# Patient Record
Sex: FEMALE | Race: WHITE | NOT HISPANIC OR LATINO | URBAN - METROPOLITAN AREA
[De-identification: names, ages, dates, MRNs, and addresses within clinical notes are randomized per-mention and may not be internally consistent; named-entity substitution may affect disease eponyms.]

---

## 2018-05-18 ENCOUNTER — TRANSFERRED RECORDS (OUTPATIENT)
Dept: HEALTH INFORMATION MANAGEMENT | Facility: CLINIC | Age: 73
End: 2018-05-18

## 2018-05-21 ENCOUNTER — MEDICAL CORRESPONDENCE (OUTPATIENT)
Dept: HEALTH INFORMATION MANAGEMENT | Facility: CLINIC | Age: 73
End: 2018-05-21

## 2018-05-25 ENCOUNTER — PRE VISIT (OUTPATIENT)
Dept: NEUROLOGY | Facility: CLINIC | Age: 73
End: 2018-05-25

## 2018-05-25 NOTE — TELEPHONE ENCOUNTER
FUTURE VISIT INFORMATION      FUTURE VISIT INFORMATION:    Date: 05/30/2018     Time:     Location:   REFERRAL INFORMATION:    Referring provider:  No Records for thist patient. Patients Care Everywhere ID is [id number]     Referring providers clinic:      Reason for visit/diagnosis      RECORDS REQUESTED FROM:       Clinic name Comments Records Status Imaging Status   [facility]   Patients Care Everywhere ID is [id number].   CALLED PATEINT AND LEFT MESSAGE TO GIVE NEUROLOGY A CALL REGADING RECORDS.  IN-PROCESS IN-PROCESS                                   RECORDS STATUS    Internal Records: NONE   EXTERNAL RECORDS: NONE

## 2018-05-29 ASSESSMENT — ENCOUNTER SYMPTOMS
ALTERED TEMPERATURE REGULATION: 1
PANIC: 0
NECK MASS: 0
DIFFICULTY URINATING: 0
TROUBLE SWALLOWING: 0
HEADACHES: 1
NUMBNESS: 0
WEIGHT LOSS: 0
EYE REDNESS: 1
TASTE DISTURBANCE: 0
EYE IRRITATION: 1
INCREASED ENERGY: 0
DEPRESSION: 0
JOINT SWELLING: 0
ARTHRALGIAS: 1
DECREASED APPETITE: 0
SEIZURES: 0
NERVOUS/ANXIOUS: 1
CHILLS: 0
LOSS OF CONSCIOUSNESS: 0
WEAKNESS: 0
DIARRHEA: 0
SORE THROAT: 0
RECTAL PAIN: 0
FEVER: 0
SINUS CONGESTION: 1
EYE WATERING: 0
STIFFNESS: 1
HEMATURIA: 0
WEIGHT GAIN: 0
SINUS PAIN: 0
SPEECH CHANGE: 0
VOMITING: 0
JAUNDICE: 0
BLOATING: 0
CONSTIPATION: 0
HOARSE VOICE: 0
EYE PAIN: 0
TINGLING: 0
INSOMNIA: 1
POLYPHAGIA: 0
NAUSEA: 1
HEARTBURN: 0
HALLUCINATIONS: 0
ABDOMINAL PAIN: 0
BLOOD IN STOOL: 0
DYSURIA: 0
NECK PAIN: 1
TREMORS: 0
NIGHT SWEATS: 1
FLANK PAIN: 0
MUSCLE CRAMPS: 0
DOUBLE VISION: 0
FATIGUE: 1
DISTURBANCES IN COORDINATION: 1
MEMORY LOSS: 1
PARALYSIS: 0
MUSCLE WEAKNESS: 0
DECREASED CONCENTRATION: 1
BOWEL INCONTINENCE: 0
DIZZINESS: 1
SMELL DISTURBANCE: 0
MYALGIAS: 0
POLYDIPSIA: 0
BACK PAIN: 1

## 2018-05-30 ENCOUNTER — PRE VISIT (OUTPATIENT)
Dept: NEUROLOGY | Facility: CLINIC | Age: 73
End: 2018-05-30

## 2018-05-30 ENCOUNTER — OFFICE VISIT (OUTPATIENT)
Dept: NEUROLOGY | Facility: CLINIC | Age: 73
End: 2018-05-30
Payer: COMMERCIAL

## 2018-05-30 VITALS
TEMPERATURE: 97.8 F | RESPIRATION RATE: 24 BRPM | DIASTOLIC BLOOD PRESSURE: 88 MMHG | OXYGEN SATURATION: 97 % | SYSTOLIC BLOOD PRESSURE: 132 MMHG | HEART RATE: 95 BPM

## 2018-05-30 DIAGNOSIS — H53.9 VISUAL DISTURBANCE: ICD-10-CM

## 2018-05-30 DIAGNOSIS — R90.82 WHITE MATTER ABNORMALITY ON MRI OF BRAIN: ICD-10-CM

## 2018-05-30 DIAGNOSIS — R42 DIZZINESS: Primary | ICD-10-CM

## 2018-05-30 DIAGNOSIS — I95.1 ORTHOSTATIC HYPOTENSION: ICD-10-CM

## 2018-05-30 PROBLEM — I10 ESSENTIAL HYPERTENSION: Status: ACTIVE | Noted: 2018-05-30

## 2018-05-30 PROBLEM — F11.93 NARCOTIC WITHDRAWAL (H): Status: ACTIVE | Noted: 2018-04-13

## 2018-05-30 PROBLEM — K29.71 GASTRITIS AND GASTRODUODENITIS WITH HEMORRHAGE: Status: ACTIVE | Noted: 2018-05-30

## 2018-05-30 PROBLEM — K29.91 GASTRITIS AND GASTRODUODENITIS WITH HEMORRHAGE: Status: ACTIVE | Noted: 2018-05-30

## 2018-05-30 PROBLEM — K21.9 ESOPHAGEAL REFLUX: Status: ACTIVE | Noted: 2018-05-30

## 2018-05-30 RX ORDER — SIMVASTATIN 10 MG
10 TABLET ORAL DAILY
COMMUNITY

## 2018-05-30 RX ORDER — HYDROXYZINE HYDROCHLORIDE 25 MG/1
1-2 TABLET, FILM COATED ORAL DAILY PRN
COMMUNITY
Start: 2018-04-13

## 2018-05-30 RX ORDER — LISINOPRIL 10 MG/1
10 TABLET ORAL DAILY
COMMUNITY
Start: 2017-08-25

## 2018-05-30 RX ORDER — DIPHENOXYLATE HYDROCHLORIDE AND ATROPINE SULFATE 2.5; .025 MG/1; MG/1
1 TABLET ORAL DAILY
COMMUNITY
Start: 2007-06-19

## 2018-05-30 RX ORDER — ASPIRIN 81 MG/1
81 TABLET ORAL DAILY
COMMUNITY
Start: 2016-03-18 | End: 2019-01-09

## 2018-05-30 RX ORDER — CETIRIZINE HYDROCHLORIDE 10 MG/1
10 TABLET ORAL DAILY
COMMUNITY
Start: 2016-04-20

## 2018-05-30 RX ORDER — VALACYCLOVIR HYDROCHLORIDE 500 MG/1
500 TABLET, FILM COATED ORAL DAILY
COMMUNITY
Start: 2017-09-26

## 2018-05-30 RX ORDER — NAPROXEN SODIUM 220 MG
220 TABLET ORAL 2 TIMES DAILY PRN
COMMUNITY
Start: 2016-11-02

## 2018-05-30 RX ORDER — BIOTIN 1 MG
1 TABLET ORAL DAILY
COMMUNITY
Start: 2016-06-22 | End: 2019-01-09

## 2018-05-30 RX ORDER — HYDROCHLOROTHIAZIDE 25 MG/1
25 TABLET ORAL DAILY
COMMUNITY
Start: 2017-08-25

## 2018-05-30 RX ORDER — FLUTICASONE PROPIONATE 50 MCG
1 SPRAY, SUSPENSION (ML) NASAL DAILY
COMMUNITY
Start: 2017-08-25

## 2018-05-30 ASSESSMENT — PAIN SCALES - GENERAL: PAINLEVEL: MODERATE PAIN (5)

## 2018-05-30 NOTE — LETTER
"5/30/2018       RE: Jessica Leo  [address] [address]     Dear Colleague,    Thank you for referring your patient, Jessica Leo, to the [facility] NEUROLOGY at [facility]. Please see a copy of my visit note below.    Re: Jessica Leo      MRN# [id number]  YOB: 1945  Date of Visit: 5/30/2018    OUTPATIENT NEUROLOGY VISIT NOTE    Chief Complaint:  Vertigo and possible migraine variant evaluation    History of Present Illness  Jessica Leo is a 73-year-old female presents to the clinic today for vertigo and possible migraine variant evaluation.   Seen ENT Dr Barnard on 5/18/2018 for dizziness and imbalance evaluation and history of vertigo/BPPV. Reviewed Dr Barnard's note in Epic. History of otosclerosis and mixed hearing loss.     Vertigo about 6 weeks ago. Reports feeling of \"swaying\" ongoing, feeling nauseous but not all the time,light and sound sensitivity. Reports that symptoms started with a 4-day headache. Reports room spinning with turning head side to side. Went to the ED. Headache front and temples. Describes headache as pounding and intensity varies-5-9/10. Headache frequency about once per week. Reports that about 7 weeks she woke up with a headache lasted for 4 days and \"run away\" and occasional headache since then. Reports right>left eye increased \"floaters.\" Had floaters before but minimum. Has not seen ophthalmology recently.   Reports that she feels \"hot\" and \"sweaty.\"   Reports that she fell almost 3 years ago and \"hit her head\"-fell forward. No LOC or concussion.   Reports occasional headaches in the past but \"not migraine headaches.\" Reports that headache would be occasionally in the back of her head and pain would be throbbing and relieved with aspirin.   History of neck fusion and \"double discotomy\" about 10 years ago and occasional tension in the shoulders and headaches in the past. Reports chronic back pain. " "Patient reports that she was taking tramodol or hydrocodone and stopped using it at the beginning of this year. Patient reports persistent chronic pain and takes Aleve which does not take care of pain  Patient reports that she likes to exercise which the chronic pain and posture but she was not able to exercise in the past 6 weeks.   Patient reports that she \"barely\" was able to leave her home due to vertigo/dizziness and sound sensitivity which stable for 6 weeks.   Patient reports that [facility]   Reports similar vertigo episode but short lives -a few days in the past and was helpful with [facility].      Insomnia since March of 2018-\"just not able to sleep\" takes some medications makes \"drowsy\" but cannot sleep. No sleep apnea  Reports frequent night time (4-5 times per night) and day time urination and drinking \"a lot of water\" onset in January and reports that she was tested and no DM.     Denies history of head or neck trauma, dizziness, vertigo, loss of consciousness, seizure, double vision, blurred vision, hearing difficulty, speech or swallowing difficulty, weakness or numbness in face, arms or legs, urinary or bowel incontinence, coordination problems or gait difficulty, fever or chills.    Neurodiagnostic Testing  MRI outside reviewed  [facility]  HEAD MRI WITHOUT AND WITH IV CONTRAST  5/18/2018 12:44 PM    INDICATION: Vertigo. Imbalance. Nausea. Tinnitus Of Both Ears. Sound Sensitivity  In Both Ears.   TECHNIQUE:  Head MRI without and with IV contrast with attention to the internal  auditory canals.  CONTRAST: Gadavist 8 mL.   COMPARISON: None.    FINDINGS:     There is no evidence of diffusion restriction to suggest acute infarction. No  mass effect or midline shift is demonstrated.    The major intracranial flow voids are unremarkable. The ventricles, sulci and  cisterns are appropriate in size and configuration.    There are " small chronic left basal ganglia infarctions. There is also mild  T2-hyperintense white matter signal abnormality. This is nonspecific but  commonly reflects chronic small vessel ischemic change. The pituitary  infundibulum is deviated to the left. However, no gross pituitary mass is  demonstrated. Please note that this study is not tailored for detailed  evaluation of the pituitary gland.    No abnormal pachymeningeal or leptomeningeal enhancement is demonstrated. A  punctate focus of right frontal operculum enhancement without edema is noted and  is nonspecific. This may be vascular in origin, but is difficult to fully  evaluate.    High-resolution T2-weighted images through the level of the internal auditory  canals demonstrate no evidence of mass lesion in the cerebellopontine angles or  internal auditory canals. The 7th/8th cranial nerve complexes demonstrate an  unremarkable appearance, without evidence of pathologic enhancement. No abnormal  enhancement of these regions is demonstrated. The semicircular canals,  vestibules, cochleae are unremarkable.    The paranasal sinuses and mastoid air cells are unremarkable in signal.    The orbits appear intact. The bones and extracranial soft tissues are  unremarkable.    CONCLUSION:  1.  No evidence of acute infarction or other acute intracranial abnormality.  2.  No evidence of mass lesion or abnormal enhancement of the internal auditory  canals or cerebellopontine angle cisterns.  3.  Findings compatible with mild chronic small vessel ischemic change.  4.  Nonspecific punctate focus of subcortical enhancement in the right frontal  lobe. This may be vascular, but is not well evaluated. Clinical correlation  recommended. Consider 4-6 month follow-up, as clinically appropriate.  Labs reviewed  SANDRA in 2015 normal  ESR 25 in 2015  Negative Lyme in 2015    Past Medical History reviewed and verified with the patient  Chronic back pain  Seasonal depression and typically  "takes fluoxetine in the Fall-Winter and stopped in February  Arthritis  Occasional tension headaches in the past  Anxiety and high currently  Osteoarthritis  Insomnia since March-"just not able to sleep\"   Hypertension  Heart burn and takes omeprazole  Benign breast lump    Past Surgical History reviewed and verified with the patient  Past Surgical History:   Procedure Laterality Date     BREAST LUMPECTOMY 1988   L     BUNIONECTOMY 13,    bilateral     CERVICAL FUSION      COLONOSCOPY SCREENING 15   next 10 years     HAMMER TOE REPAIR 13     HARDWARE REMOVAL 9/18/15   right distal radius     ORIF right distal radius fracture 6/19/15   3 parts     CT EPISIOTOMY/VAGINAL REPAIR BY ROM ODONNELL   With urethral repair     CT REMOVAL GALLBLADDER      vulvar cyst removal 2016     Family History reviewed and verified with the patient  No neurological history  Autoimmune disease-father  of Chance's   Brother-Celiac disease  Heart disease on both side of patient's family  Social History:   for 5 years with 4 th , no children, retired, used to work as a clinical psychologist    Social History   Substance Use Topics     Smoking status: Never Smoker     Smokeless tobacco: Never Used     Alcohol use Yes      Comment: Minimal use    reviewed and verified with the patient     Allergies   Allergen Reactions     Hydrocodone-Acetaminophen      Other reaction(s): Vomiting     Tetracyclines Hives     Ciprofloxacin Itching     Rash after taking 3 days.  Recently on Cipro and NO reaction per patient on 14  dm     Sertraline      Other reaction(s): Other - Describe In Comment Field  Blurred vision, unsteady gait, word finding problem, libido     Trazodone      Other reaction(s): Sleep Disturbances  Patient could not sleep when taking this medication     Erythromycin Rash     Latex Rash     Tetracycline Rash       Current Outpatient Prescriptions   Medication Sig Dispense Refill     " aspirin 81 MG EC tablet Take 81 mg by mouth daily       biotin 1000 MCG TABS tablet Take 1 tablet by mouth daily       cetirizine (ZYRTEC) 10 MG tablet Take 10 mg by mouth daily       Cranberry 400 MG CAPS Take 1 capsule by mouth daily       DOCOSAHEXAENOIC ACID PO Take 1 capsule by mouth 2 times daily       fluticasone (FLONASE) 50 MCG/ACT spray Spray 1 spray in nostril daily       hydrochlorothiazide (HYDRODIURIL) 25 MG tablet Take 25 mg by mouth daily       hydrOXYzine (ATARAX) 25 MG tablet Take 1-2 mg by mouth daily as needed       lisinopril (PRINIVIL/ZESTRIL) 10 MG tablet Take 10 mg by mouth daily       Multiple Vitamin (MULTI-VITAMINS) TABS Take 1 tablet by mouth daily       naproxen sodium (ANAPROX) 220 MG tablet Take 220 mg by mouth 2 times daily as needed       omeprazole (PRILOSEC) 20 MG CR capsule Take 20 mg by mouth twice a week       simvastatin (ZOCOR) 10 MG tablet Take 10 mg by mouth daily       valACYclovir (VALTREX) 500 MG tablet Take 500 mg by mouth daily     reviewed and verified with the patient    Review of Systems:  A 12-point ROS including constitutional, eyes, ENT, respiratory, cardiovascular, gastroenterology, genitourinary, integumentary, musculoskeletal, neurology, hematology and psychiatric were all reviewed with the patient and completed at the Neuroscience Services Question nary and as mentioned in the HPI.     General Exam:   /88  Pulse 95  Temp 97.8  F (36.6  C)  Resp 24  SpO2 97%  Breastfeeding? No   Sitting 119/88 93  Standing 104/84  and feeling dizzy with standing  O2 sats 98% on RA  GEN: Awake, NAD; good eye contact, responses appropriately, back pain and joint pain today.   HEENT: Head atraumatic/Normocephalic. Scalp normal. Pupils equally round, 4 mm, reactive to light and accommodation, sclera and conjunctiva normal. Fundoscopic examination reveals normal vessels no papilledema.   Neck: Easily moveable without resistance. Head thrust test negative Heart:  S1/S2 appreciated, RRR, no m/r/g, no carotid bruits  Lungs:Lungs are clear to auscultation bilaterally, no wheezes or crackles.   Neurological Examination:  The patient is alert and oriented times four. Has good attention and concentration.  Speech is fluent without dysarthria.   Cranial nerves:  CN I deferred.   CN II: Intact and full visual fields to confrontation bilaterally.   CN III, IV, VI: EOM intact. There is no nystagmus. Has conjugated gaze. Intact direct and consensual pupillary light reflexes.   CN V: Intact and symmetrical to facial sensation in the V1 through V3 bilaterally.   CN VII: Intact and symmetrical eyebrow and lid raise and eyelid closure, smiles and frown.   CN VIII: Intact to finger rub bilaterally.   CN IX and X: The palates elevates symmetrical. The uvula is midline.   CN XII: The tongue protrudes midline with no atrophy or fasciculations.   Motor exam: The patient has a normal bulk and tone throughout. There is no atrophy, fasciculations, clonus, or abnormal movements appreciated.   Strength Exam:  5/5 strength at shoulder abduction, elbow flexion or extension, wrist flexion or extension, finger abduction, , hip flexion and extension, knee flexion and extension, and dorsiflexion and plantarflexion bilaterally.   Sensation is intact to light touch  throughout. Reflexes are 2 and symmetrical at biceps, triceps, brachioradialis, patellar, and 1/4 bilateral Achilles.   Negative Babinski with downgoing toes bilaterally.   Coordination reveals finger-nose-finger, rapid alternating movements, finger tapping, and toe tapping with normal speed and accuracy.   Station and gait is normal. There is no ataxia. Can walk on the toes, heels, and tandem walk without difficulty. Has good postural reflexes.Has no drift and a negative Romberg. Corrine's negative        Assessment and Plan:  Dizziness/vertigo for about 6 weeks stable. Possible multifactorial. No history of migraine headaches in the  "past. Patient's presented symptoms do not fit in the typical migraine pattern.  More concerned of vascular etiology-cerebral or cardiologic or other  Seen ENT. Non focal exam findings today except orthostatic BP and patient reports dizziness with standing. Patient feeling room spinning sensation with head turning and position changes but may occur with sitting still. Brain MRI on 5/18 amd no acute findings to explain patient's symptoms.   Patient had evaluation thru her PCP for any other medical reasons, but no CBC, TSH, Vitamin B12 and MMA, A1C (frequent urination).  Vestibular therapy   Hydration and stop caffeine, good sleep routine  and food triggers-no chocolate, alcohol.   Recheck your blood pressure at home and see your PCP for blood pressure variation. Your blood pressure today dropped with position changed from sitting to standing  Talk to your PCP about insomnia which may be contributing to your symptoms  If persists, would recommend further imaging with brain and neck MR angiogram for posterior circulation evaluation      Right eye floaters worsening. Recommended ophthalmology evaluation.       Per outside brain MRI report \"Nonspecific punctate focus of subcortical enhancement in the right frontal  lobe. This may be vascular, but is not well evaluated. Clinical correlation  recommended. Consider 4-6 month follow-up, as clinically appropriate\".  Recommended to repeat imaging in 4-6 month as it was recommended by a radiologist      I discussed all my recommendation with Jessica Leo. The patient verbalizes understanding and comfortable with the plan. The patient has our clinic phone number to call with any questions or concerns. All of the patient's questions were answered from the best of my current knowledge.     Thank you for letting me be a part of the treatment team for Jessica Leo    Time spent with pt answering questions, discussing findings, counseling and coordinating care was more than 50% the " appointment time, 65  minutes.         NAIDA Jean-Baptiste, CNP  [facility] Neurology [facility]

## 2018-05-30 NOTE — PROGRESS NOTES
"Re: Jessica Leo      MRN# [id number]  YOB: 1945  Date of Visit: 5/30/2018    OUTPATIENT NEUROLOGY VISIT NOTE    Chief Complaint:  Vertigo and possible migraine variant evaluation    History of Present Illness  Jessica Leo is a 73-year-old female presents to the clinic today for vertigo and possible migraine variant evaluation.   Seen ENT Dr Barnard on 5/18/2018 for dizziness and imbalance evaluation and history of vertigo/BPPV. Reviewed Dr Barnard's note in Epic. History of otosclerosis and mixed hearing loss.     Vertigo about 6 weeks ago. Reports feeling of \"swaying\" ongoing, feeling nauseous but not all the time,light and sound sensitivity. Reports that symptoms started with a 4-day headache. Reports room spinning with turning head side to side. Went to the ED. Headache front and temples. Describes headache as pounding and intensity varies-5-9/10. Headache frequency about once per week. Reports that about 7 weeks she woke up with a headache lasted for 4 days and \"run away\" and occasional headache since then. Reports right>left eye increased \"floaters.\" Had floaters before but minimum. Has not seen ophthalmology recently.   Reports that she feels \"hot\" and \"sweaty.\"   Reports that she fell almost 3 years ago and \"hit her head\"-fell forward. No LOC or concussion.   Reports occasional headaches in the past but \"not migraine headaches.\" Reports that headache would be occasionally in the back of her head and pain would be throbbing and relieved with aspirin.   History of neck fusion and \"double discotomy\" about 10 years ago and occasional tension in the shoulders and headaches in the past. Reports chronic back pain. Patient reports that she was taking tramodol or hydrocodone and stopped using it at the beginning of this year. Patient reports persistent chronic pain and takes Aleve which does not take care of pain  Patient reports that she likes to exercise which the chronic pain and posture " "but she was not able to exercise in the past 6 weeks.   Patient reports that she \"barely\" was able to leave her home due to vertigo/dizziness and sound sensitivity which stable for 6 weeks.   Patient reports that she is due for vestibular therapy in the [facility].   Reports similar vertigo episode but short lives -a few days in the past and was helpful with [facility].      Insomnia since March of 2018-\"just not able to sleep\" takes some medications makes \"drowsy\" but cannot sleep. No sleep apnea  Reports frequent night time (4-5 times per night) and day time urination and drinking \"a lot of water\" onset in January and reports that she was tested and no DM.     Denies history of head or neck trauma, dizziness, vertigo, loss of consciousness, seizure, double vision, blurred vision, hearing difficulty, speech or swallowing difficulty, weakness or numbness in face, arms or legs, urinary or bowel incontinence, coordination problems or gait difficulty, fever or chills.    Neurodiagnostic Testing  MRI outside reviewed  [facility]  HEAD MRI WITHOUT AND WITH IV CONTRAST  5/18/2018 12:44 PM    INDICATION: Vertigo. Imbalance. Nausea. Tinnitus Of Both Ears. Sound Sensitivity  In Both Ears.   TECHNIQUE:  Head MRI without and with IV contrast with attention to the internal  auditory canals.  CONTRAST: Gadavist 8 mL.   COMPARISON: None.    FINDINGS:     There is no evidence of diffusion restriction to suggest acute infarction. No  mass effect or midline shift is demonstrated.    The major intracranial flow voids are unremarkable. The ventricles, sulci and  cisterns are appropriate in size and configuration.    There are small chronic left basal ganglia infarctions. There is also mild  T2-hyperintense white matter signal abnormality. This is nonspecific but  commonly reflects chronic small vessel ischemic change. The pituitary  infundibulum is deviated to the left. However, no gross pituitary mass " "is  demonstrated. Please note that this study is not tailored for detailed  evaluation of the pituitary gland.    No abnormal pachymeningeal or leptomeningeal enhancement is demonstrated. A  punctate focus of right frontal operculum enhancement without edema is noted and  is nonspecific. This may be vascular in origin, but is difficult to fully  evaluate.    High-resolution T2-weighted images through the level of the internal auditory  canals demonstrate no evidence of mass lesion in the cerebellopontine angles or  internal auditory canals. The 7th/8th cranial nerve complexes demonstrate an  unremarkable appearance, without evidence of pathologic enhancement. No abnormal  enhancement of these regions is demonstrated. The semicircular canals,  vestibules, cochleae are unremarkable.    The paranasal sinuses and mastoid air cells are unremarkable in signal.    The orbits appear intact. The bones and extracranial soft tissues are  unremarkable.    CONCLUSION:  1.  No evidence of acute infarction or other acute intracranial abnormality.  2.  No evidence of mass lesion or abnormal enhancement of the internal auditory  canals or cerebellopontine angle cisterns.  3.  Findings compatible with mild chronic small vessel ischemic change.  4.  Nonspecific punctate focus of subcortical enhancement in the right frontal  lobe. This may be vascular, but is not well evaluated. Clinical correlation  recommended. Consider 4-6 month follow-up, as clinically appropriate.  Labs reviewed  SANDRA in 2015 normal  ESR 25 in 2015  Negative Lyme in 2015    Past Medical History reviewed and verified with the patient  Chronic back pain  Seasonal depression and typically takes fluoxetine in the Fall-Winter and stopped in February  Arthritis  Occasional tension headaches in the past  Anxiety and high currently  Osteoarthritis  Insomnia since March-\"just not able to sleep\"   Hypertension  Heart burn and takes omeprazole  Benign breast lump    Past " Surgical History reviewed and verified with the patient  Past Surgical History:   Procedure Laterality Date     BREAST LUMPECTOMY 1988   L     BUNIONECTOMY 2013   bilateral     CERVICAL FUSION      COLONOSCOPY SCREENING 13   next 10 years     HAMMER TOE REPAIR 11     HARDWARE REMOVAL 9/18/15   right distal radius     ORIF right distal radius fracture 6/19/15   3 parts     UT EPISIOTOMY/VAGINAL REPAIR BY ROM ODONNELL   With urethral repair     UT REMOVAL GALLBLADDER      vulvar cyst removal 2016     Family History reviewed and verified with the patient  No neurological history  Autoimmune disease-father  of Chacne's   Brother-Celiac disease  Heart disease on both side of patient's family  Social History:   for 5 years with 4 th , no children, retired, used to work as a clinical psychologist    Social History   Substance Use Topics     Smoking status: Never Smoker     Smokeless tobacco: Never Used     Alcohol use Yes      Comment: Minimal use    reviewed and verified with the patient     Allergies   Allergen Reactions     Hydrocodone-Acetaminophen      Other reaction(s): Vomiting     Tetracyclines Hives     Ciprofloxacin Itching     Rash after taking 3 days.  Recently on Cipro and NO reaction per patient on 14  dm     Sertraline      Other reaction(s): Other - Describe In Comment Field  Blurred vision, unsteady gait, word finding problem, libido     Trazodone      Other reaction(s): Sleep Disturbances  Patient could not sleep when taking this medication     Erythromycin Rash     Latex Rash     Tetracycline Rash       Current Outpatient Prescriptions   Medication Sig Dispense Refill     aspirin 81 MG EC tablet Take 81 mg by mouth daily       biotin 1000 MCG TABS tablet Take 1 tablet by mouth daily       cetirizine (ZYRTEC) 10 MG tablet Take 10 mg by mouth daily       Cranberry 400 MG CAPS Take 1 capsule by mouth daily       DOCOSAHEXAENOIC ACID PO Take 1 capsule  by mouth 2 times daily       fluticasone (FLONASE) 50 MCG/ACT spray Spray 1 spray in nostril daily       hydrochlorothiazide (HYDRODIURIL) 25 MG tablet Take 25 mg by mouth daily       hydrOXYzine (ATARAX) 25 MG tablet Take 1-2 mg by mouth daily as needed       lisinopril (PRINIVIL/ZESTRIL) 10 MG tablet Take 10 mg by mouth daily       Multiple Vitamin (MULTI-VITAMINS) TABS Take 1 tablet by mouth daily       naproxen sodium (ANAPROX) 220 MG tablet Take 220 mg by mouth 2 times daily as needed       omeprazole (PRILOSEC) 20 MG CR capsule Take 20 mg by mouth twice a week       simvastatin (ZOCOR) 10 MG tablet Take 10 mg by mouth daily       valACYclovir (VALTREX) 500 MG tablet Take 500 mg by mouth daily     reviewed and verified with the patient    Review of Systems:  A 12-point ROS including constitutional, eyes, ENT, respiratory, cardiovascular, gastroenterology, genitourinary, integumentary, musculoskeletal, neurology, hematology and psychiatric were all reviewed with the patient and completed at the Neuroscience Services Question nary and as mentioned in the HPI.     General Exam:   /88  Pulse 95  Temp 97.8  F (36.6  C)  Resp 24  SpO2 97%  Breastfeeding? No   Sitting 119/88 93  Standing 104/84  and feeling dizzy with standing  O2 sats 98% on RA  GEN: Awake, NAD; good eye contact, responses appropriately, back pain and joint pain today.   HEENT: Head atraumatic/Normocephalic. Scalp normal. Pupils equally round, 4 mm, reactive to light and accommodation, sclera and conjunctiva normal. Fundoscopic examination reveals normal vessels no papilledema.   Neck: Easily moveable without resistance. Head thrust test negative Heart: S1/S2 appreciated, RRR, no m/r/g, no carotid bruits  Lungs:Lungs are clear to auscultation bilaterally, no wheezes or crackles.   Neurological Examination:  The patient is alert and oriented times four. Has good attention and concentration.  Speech is fluent without dysarthria.    Cranial nerves:  CN I deferred.   CN II: Intact and full visual fields to confrontation bilaterally.   CN III, IV, VI: EOM intact. There is no nystagmus. Has conjugated gaze. Intact direct and consensual pupillary light reflexes.   CN V: Intact and symmetrical to facial sensation in the V1 through V3 bilaterally.   CN VII: Intact and symmetrical eyebrow and lid raise and eyelid closure, smiles and frown.   CN VIII: Intact to finger rub bilaterally.   CN IX and X: The palates elevates symmetrical. The uvula is midline.   CN XII: The tongue protrudes midline with no atrophy or fasciculations.   Motor exam: The patient has a normal bulk and tone throughout. There is no atrophy, fasciculations, clonus, or abnormal movements appreciated.   Strength Exam:  5/5 strength at shoulder abduction, elbow flexion or extension, wrist flexion or extension, finger abduction, , hip flexion and extension, knee flexion and extension, and dorsiflexion and plantarflexion bilaterally.   Sensation is intact to light touch  throughout. Reflexes are 2 and symmetrical at biceps, triceps, brachioradialis, patellar, and 1/4 bilateral Achilles.   Negative Babinski with downgoing toes bilaterally.   Coordination reveals finger-nose-finger, rapid alternating movements, finger tapping, and toe tapping with normal speed and accuracy.   Station and gait is normal. There is no ataxia. Can walk on the toes, heels, and tandem walk without difficulty. Has good postural reflexes.Has no drift and a negative Romberg. Corrine's negative        Assessment and Plan:  Dizziness/vertigo for about 6 weeks stable. Possible multifactorial. No history of migraine headaches in the past. Patient's presented symptoms do not fit in the typical migraine pattern.  More concerned of vascular etiology-cerebral or cardiologic or other  Seen ENT. Non focal exam findings today except orthostatic BP and patient reports dizziness with standing. Patient feeling room  "spinning sensation with head turning and position changes but may occur with sitting still. Brain MRI on 5/18 amd no acute findings to explain patient's symptoms.   Patient had evaluation thru her PCP for any other medical reasons, but no CBC, TSH, Vitamin B12 and MMA, A1C (frequent urination).  Vestibular therapy   Hydration and stop caffeine, good sleep routine  and food triggers-no chocolate, alcohol.   Recheck your blood pressure at home and see your PCP for blood pressure variation. Your blood pressure today dropped with position changed from sitting to standing  Talk to your PCP about insomnia which may be contributing to your symptoms  If persists, would recommend further imaging with brain and neck MR angiogram for posterior circulation evaluation      Right eye floaters worsening. Recommended ophthalmology evaluation.       Per outside brain MRI report \"Nonspecific punctate focus of subcortical enhancement in the right frontal  lobe. This may be vascular, but is not well evaluated. Clinical correlation  recommended. Consider 4-6 month follow-up, as clinically appropriate\".  Recommended to repeat imaging in 4-6 month as it was recommended by a radiologist      I discussed all my recommendation with Jessica Leo. The patient verbalizes understanding and comfortable with the plan. The patient has our clinic phone number to call with any questions or concerns. All of the patient's questions were answered from the best of my current knowledge.     Thank you for letting me be a part of the treatment team for Jessica Leo    Time spent with pt answering questions, discussing findings, counseling and coordinating care was more than 50% the appointment time, 65  minutes.         NAIDA Jean-Baptiste, [facility]    "

## 2018-05-30 NOTE — MR AVS SNAPSHOT
"              After Visit Summary   5/30/2018    Jessica Leo    MRN: [id number]           Patient Information     Date Of Birth          1945        Visit Information        Provider Department      5/30/2018 10:30 AM Ines Peoples APRN [facility]        Care Instructions    Patient had evaluation thru her PCP for any other medical reasons, but no CBC, TSH, Vitamin B12 and MMA, A1C (frequent urination).  Vestibular therapy   Hydration and stop caffeine, good sleep routine  and food triggers-no chocolate, alcohol.   Recheck your blood pressure at home and see your PCP for blood pressure variation. Your blood pressure today dropped with position changed from sitting to standing  Talk to your PCP about insomnia which may be contributing to your symptoms  If persists, would recommend further imaging with brain and neck MR angiogram for posterior circulation evaluation  Return to our Clinic as needed if not improving or any other concerns      Right eye floaters worsening. Recommended to see ophthalmologist.       Per outside brain MRI report \"Nonspecific punctate focus of subcortical enhancement in the right frontal  lobe. This may be vascular, but is not well evaluated. Clinical correlation  recommended. Consider 4-6 month follow-up, as clinically appropriate\".              Follow-ups after your visit        Who to contact     Please call your clinic at [phone] to:    Ask questions about your health    Make or cancel appointments    Discuss your medicines    Learn about your test results    Speak to your doctor            Additional Information About Your Visit        MyChart Information     [employer] gives you secure access to your electronic health record. If you see a primary care provider, you can also send messages to your care team and make appointments. If you have questions, please call your primary care clinic.  If you do not have a primary care provider, please call " [phone] and they will assist you.      [employer] is an electronic gateway that provides easy, online access to your medical records. With [employer], you can request a clinic appointment, read your test results, renew a prescription or communicate with your care team.     To access your existing account, please contact your [facility] or call [phone] for assistance.        Care EveryWhere ID     This is your Care EveryWhere ID. This could be used by other organizations to access your [place] medical records  [id number]        Your Vitals Were     Pulse Temperature Respirations Pulse Oximetry Breastfeeding?       95 97.8  F (36.6  C) 24 97% No        Blood Pressure from Last 3 Encounters:   05/30/18 132/88    Weight from Last 3 Encounters:   No data found for Wt              Today, you had the following     No orders found for display       Primary Care Provider Office Phone # Fax #    Nita Fulton [phone]       [facility] [address] [facility] [address]        Equal Access to Services     TOMAS BLUM : Hadii aad ku hadasho Soomaali, waaxda luqadaha, qaybta kaalmada adeegyada, waxay idiin hayaan adeeg khararebecca la'jose . So [facility] [phone].    ATENCIÓN: Si habla español, tiene a oquendo disposición servicios gratuitos de asistencia lingüística. Abhishek al [phone].    We comply with applicable federal civil rights laws and [place] laws. We do not discriminate on the basis of race, color, national origin, age, disability, sex, sexual orientation, or gender identity.            Thank you!     Thank you for choosing [facility]  for your care. Our goal is always to provide you with excellent care. Hearing back from our patients is one way we can continue to improve our services. Please take a few minutes to complete the written survey that you may receive in the mail after your visit with us. Thank you!             Your Updated Medication List  - Protect others around you: Learn how to safely use, store and throw away your medicines at www.disposemymeds.org.          This list is accurate as of 5/30/18 12:16 PM.  Always use your most recent med list.                   Brand Name Dispense Instructions for use Diagnosis    aspirin 81 MG EC tablet      Take 81 mg by mouth daily        biotin 1000 MCG Tabs tablet      Take 1 tablet by mouth daily        cetirizine 10 MG tablet    zyrTEC     Take 10 mg by mouth daily        Cranberry 400 MG Caps      Take 1 capsule by mouth daily        DOCOSAHEXAENOIC ACID PO      Take 1 capsule by mouth 2 times daily        fluticasone 50 MCG/ACT spray    FLONASE     Spray 1 spray in nostril daily        hydrochlorothiazide 25 MG tablet    HYDRODIURIL     Take 25 mg by mouth daily        hydrOXYzine 25 MG tablet    ATARAX     Take 1-2 mg by mouth daily as needed        lisinopril 10 MG tablet    PRINIVIL/ZESTRIL     Take 10 mg by mouth daily        MULTI-VITAMINS Tabs      Take 1 tablet by mouth daily        naproxen sodium 220 MG tablet    ANAPROX     Take 220 mg by mouth 2 times daily as needed        omeprazole 20 MG CR capsule    priLOSEC     Take 20 mg by mouth twice a week        simvastatin 10 MG tablet    ZOCOR     Take 10 mg by mouth daily        valACYclovir 500 MG tablet    VALTREX     Take 500 mg by mouth daily

## 2018-09-26 ENCOUNTER — TRANSFERRED RECORDS (OUTPATIENT)
Dept: HEALTH INFORMATION MANAGEMENT | Facility: CLINIC | Age: 73
End: 2018-09-26

## 2019-01-09 ENCOUNTER — OFFICE VISIT (OUTPATIENT)
Dept: FAMILY MEDICINE | Facility: CLINIC | Age: 74
End: 2019-01-09
Payer: MEDICARE

## 2019-01-09 VITALS
DIASTOLIC BLOOD PRESSURE: 82 MMHG | BODY MASS INDEX: 34.93 KG/M2 | RESPIRATION RATE: 16 BRPM | SYSTOLIC BLOOD PRESSURE: 120 MMHG | WEIGHT: 185 LBS | OXYGEN SATURATION: 97 % | HEIGHT: 61 IN | TEMPERATURE: 97.1 F | HEART RATE: 99 BPM

## 2019-01-09 DIAGNOSIS — I10 HYPERTENSION GOAL BP (BLOOD PRESSURE) < 140/90: ICD-10-CM

## 2019-01-09 DIAGNOSIS — M77.12 LEFT TENNIS ELBOW: Primary | ICD-10-CM

## 2019-01-09 PROCEDURE — 99203 OFFICE O/P NEW LOW 30 MIN: CPT | Performed by: FAMILY MEDICINE

## 2019-01-09 RX ORDER — DICLOFENAC SODIUM 75 MG/1
75 TABLET, DELAYED RELEASE ORAL 2 TIMES DAILY
Qty: 90 TABLET | Refills: 3 | Status: SHIPPED | OUTPATIENT
Start: 2019-01-09 | End: 2020-01-09

## 2019-01-09 RX ORDER — OMEGA-3 FATTY ACIDS/FISH OIL 300-1000MG
2 CAPSULE ORAL 2 TIMES DAILY
COMMUNITY

## 2019-01-09 RX ORDER — AMITRIPTYLINE HYDROCHLORIDE 10 MG/1
20 TABLET ORAL
COMMUNITY
Start: 2018-06-29

## 2019-01-09 ASSESSMENT — ANXIETY QUESTIONNAIRES
5. BEING SO RESTLESS THAT IT IS HARD TO SIT STILL: NOT AT ALL
2. NOT BEING ABLE TO STOP OR CONTROL WORRYING: SEVERAL DAYS
6. BECOMING EASILY ANNOYED OR IRRITABLE: NOT AT ALL
7. FEELING AFRAID AS IF SOMETHING AWFUL MIGHT HAPPEN: NOT AT ALL
3. WORRYING TOO MUCH ABOUT DIFFERENT THINGS: MORE THAN HALF THE DAYS
GAD7 TOTAL SCORE: 7
IF YOU CHECKED OFF ANY PROBLEMS ON THIS QUESTIONNAIRE, HOW DIFFICULT HAVE THESE PROBLEMS MADE IT FOR YOU TO DO YOUR WORK, TAKE CARE OF THINGS AT HOME, OR GET ALONG WITH OTHER PEOPLE: NOT DIFFICULT AT ALL
1. FEELING NERVOUS, ANXIOUS, OR ON EDGE: MORE THAN HALF THE DAYS

## 2019-01-09 ASSESSMENT — PATIENT HEALTH QUESTIONNAIRE - PHQ9
5. POOR APPETITE OR OVEREATING: MORE THAN HALF THE DAYS
SUM OF ALL RESPONSES TO PHQ QUESTIONS 1-9: 1

## 2019-01-09 ASSESSMENT — MIFFLIN-ST. JEOR: SCORE: 1283.59

## 2019-01-09 NOTE — PATIENT INSTRUCTIONS
Patient Education     Tennis Elbow  Muscles connect to bones by thick, fibrous cords (tendons). When the muscles are overused by repeated motion, the tendons may become inflamed and painful. This condition is called tendonitis.  Tennis elbow (lateral epicondylitis) is a form of tendonitis. It occurs when the forearm muscles are used again and again in a twisting motion. Pain from tennis elbow occurs mainly on the outside of the elbow. But the pain can spread into the forearm and wrist. Your elbow may also be swollen and tender to the touch.  The pain may get worse when you move your arm or do simple activities. Bending your wrist back, shaking hands, or turning a doorknob may cause pain. The pain often gets worse after several weeks or months. Sometimes you may feel pain when your arm is still.  Tennis players who use a backhand stroke with poor technique are more likely to get tennis elbow. But playing tennis is only one cause of tennis elbow. Other common activities that can cause it include:    Hammering    Painting    Raking  Besides tennis players, people at risk include , gardeners, musicians, and dentists. Sometimes people get tennis elbow without doing anything that would cause the injury.  Treatment includes resting the arm and taking anti-inflammatory medicines. Special splints can help ease symptoms. Symptoms should get better after 4 to 6 weeks of rest. You may need steroid injections if resting and using a splint don t help. After the pain is relieved, you should change your activities so the symptoms don t return. You may need physical therapy. It may include stretching, range-of-motion, and strengthening exercises. These treatments help most cases. You may need surgery if your symptoms continue for 6 months despite treatment.  Home care  Follow these guidelines when caring for yourself at home:    Rest your elbow as needed. Protect it from movement that causes pain. You may be told to use  a forearm splint at night to ease symptoms in the morning. Your healthcare provider may recommend a special wrap or splint to compress the muscles of the forearm. This can ease pain during daytime activities. As your symptoms get better, start to move your elbow more.    Put an ice pack on the injured area. Do this for 20 minutes every 1 to 2 hours the first day for pain relief. You can make an ice pack by wrapping a plastic bag of ice cubes in a thin towel. Continue using the ice pack 3 to 4 times a day for the next several days. Then use the ice pack as needed to ease pain and swelling.    You may use acetaminophen or ibuprofen to control pain, unless another pain medicine was prescribed. If you have chronic liver or kidney disease, talk with your healthcare provider before using these medicines. Also talk with your provider if you ve had a stomach ulcer or gastrointestinal bleeding.    After your elbow heals, avoid the motion that caused your pain. Or learn to move in a way that causes less stress on the tendon. Using a forearm wrap may keep tennis elbow from happening again.    A tennis elbow strap may ease pain and keep you from further injury when you start playing tennis again. You can also lower your risk for injury by warming up before you play and cooling down afterward. You should also use the right equipment. For instance, make sure your racquet has the right  and is the right size for you.  Follow-up care  Follow up with your healthcare provider, or as advised, if your symptoms don t get better after 2 to 3 weeks of treatment.  When to seek medical advice  Call your healthcare provider right away if any of these occur:    Redness over the painful area    Pain, stiffness,  or swelling at the elbow gets worse    Any numbness or tingling in your arm, hands, or fingers    Unexplained fever over 100.4 F (38 C)   Date Last Reviewed: 5/1/2017 2000-2018 [employer] [address] [facility],  [place] [address]. All rights reserved. This information is not intended as a substitute for professional medical care. Always follow your healthcare professional's instructions.

## 2019-01-09 NOTE — PROGRESS NOTES
SUBJECTIVE:   Jessica Leo is a 73 year old female who presents to clinic today for the following health issues:      New Patient/Transfer of Care    Musculoskeletal problem/pain      Duration: x 1 week    Description  Location: left elbow, pt thinks she may have tennis elbow    Intensity:  8/10    Accompanying signs and symptoms: radiation of pain to shoulder and hand, burns in elbow, numbness to fingers and hand, shoulder feels like there is a catch in it when she moves it a certain way with severe pain    History  Previous similar problem: YES- x 7 years ago  Previous evaluation:  none    Precipitating or alleviating factors:  Trauma or overuse: YES- pt was packing and moving the last month  Aggravating factors include: movement and pain without moving arm    Therapies tried and outcome: rest/inactivity and NSAID - aleve       Reports that she has had tennis elbow before in the past about 7 years ago, eventually needing Physical Therapy.     Blood pressure is elevated in the clinic today,150/90 patient has a known history of/ hypertension but states it's usually well controlled.     Problem list and histories reviewed & adjusted, as indicated.  Additional history: as documented    Patient Active Problem List   Diagnosis     ACP (advance care planning)     Allergic rhinitis     Anxiety     Chronic sinusitis     Conductive hearing loss, unilateral     Constipation     Controlled substance agreement signed     Depression, recurrent (H)     Esophageal reflux     Health maintenance examination     Hyperlipidemia with target low density lipoprotein (LDL) cholesterol less than 130 mg/dL     LVH (left ventricular hypertrophy) due to hypertensive disease     Narcotic withdrawal (H)     Obese     Osteoarthrosis     Pain from implanted hardware     Pain in joint, shoulder region     S/P cervical spinal fusion     Sensorineural hearing loss, unilateral     Skin tag     Tailor's bunion     Tinnitus     Essential  hypertension     Gastritis and gastroduodenitis with hemorrhage     Past Surgical History:   Procedure Laterality Date     BUNIONECTOMY Bilateral      CHOLECYSTECTOMY, OPEN       FUSION CERVICAL POSTERIOR TWO LEVELS       hammer toe Left     repair     LUMPECTOMY BREAST Left        Social History     Tobacco Use     Smoking status: Never Smoker     Smokeless tobacco: Never Used   Substance Use Topics     Alcohol use: Yes     Comment: Minimal use     Family History   Problem Relation Age of Onset     Cancer Paternal Grandfather      Diabetes Mother      Heart Disease Mother      Diabetes Maternal Grandmother      Heart Disease Maternal Grandmother      Diabetes Paternal Grandmother      Heart Disease Paternal Grandmother      Heart Disease Father      LUNG DISEASE Father          of Wegener s Granulamatosis     Heart Disease Brother      Heart Disease Maternal Grandfather          Current Outpatient Medications   Medication Sig Dispense Refill     amitriptyline (ELAVIL) 10 MG tablet Take 20 mg by mouth       cetirizine (ZYRTEC) 10 MG tablet Take 10 mg by mouth daily       Cranberry 400 MG CAPS Take 1 capsule by mouth daily       diclofenac (VOLTAREN) 75 MG EC tablet Take 1 tablet (75 mg) by mouth 2 times daily 90 tablet 3     fluticasone (FLONASE) 50 MCG/ACT spray Spray 1 spray in nostril daily       hydrochlorothiazide (HYDRODIURIL) 25 MG tablet Take 25 mg by mouth daily       hydrOXYzine (ATARAX) 25 MG tablet Take 1-2 mg by mouth daily as needed       lisinopril (PRINIVIL/ZESTRIL) 10 MG tablet Take 10 mg by mouth daily       Multiple Vitamin (MULTI-VITAMINS) TABS Take 1 tablet by mouth daily       naproxen sodium (ANAPROX) 220 MG tablet Take 220 mg by mouth 2 times daily as needed       omega 3 1000 MG CAPS Take 2 g by mouth 2 times daily       omeprazole (PRILOSEC) 20 MG CR capsule Take 20 mg by mouth twice a week       simvastatin (ZOCOR) 10 MG tablet Take 10 mg by mouth daily       valACYclovir (VALTREX) 500  "MG tablet Take 500 mg by mouth daily       Allergies   Allergen Reactions     Hydrocodone-Acetaminophen      Other reaction(s): Vomiting     Tetracyclines Hives     Sertraline      Other reaction(s): Other - Describe In Comment Field  Blurred vision, unsteady gait, word finding problem, libido     Trazodone      Other reaction(s): Sleep Disturbances  Patient could not sleep when taking this medication     Erythromycin Rash     Latex Rash     Tetracycline Rash       Reviewed and updated as needed this visit by clinical staff       Reviewed and updated as needed this visit by Provider         ROS:  Constitutional, HEENT, cardiovascular, pulmonary, gi and gu systems are negative, except as otherwise noted.    OBJECTIVE:     /82   Pulse 99   Temp 97.1  F (36.2  C) (Tympanic)   Resp 16   Ht 1.537 m (5' 0.5\")   Wt 83.9 kg (185 lb)   SpO2 97%   BMI 35.54 kg/m    Body mass index is 35.54 kg/m .  GENERAL: healthy, alert and no distress  RESP: lungs clear to auscultation - no rales, rhonchi or wheezes  CV: regular rate and rhythm, normal S1 S2, no S3 or S4, no murmur, click or rub, no peripheral edema and peripheral pulses strong  MS: Tenderness over the left lateral epicondyl, pain with resisted wrist extension with elbow in full extension.  PSYCH: mentation appears normal, affect normal/bright    Diagnostic Test Results:  none     ASSESSMENT/PLAN:     Jessica was seen today for establish care and elbow pain.    Diagnoses and all orders for this visit:    Left tennis elbow  -     diclofenac (VOLTAREN) 75 MG EC tablet; Take 1 tablet (75 mg) by mouth 2 times daily  -  Recommended wearing Counter brace - states that she has one at home.   - PHYSICAL THERAPY REFERRAL; Future      Hypertension goal BP (blood pressure) < 140/90  Repeat blood pressure at the end of the visit was within goal.   Continue current management.       Follow up if symptoms fail to improve or worsen.      The patient was in agreement with the plan " today and had no questions or concerns prior to leaving the clinic.    Schedule a Physical Exam with labs in 6 months, sooner if needed.       Savita Kiran MD  [facility]

## 2019-01-09 NOTE — PROGRESS NOTES
"  SUBJECTIVE:   Jessica Leo is a 73 year old female who presents for Preventive Visit.  {PVP to remind patient that this is not necessarily a physical exam; physical exam may or may not be done:959404::\"click delete button to remove this line now\"}  {PVP to inform patient that additional E&M charge may apply, if additional problems addressed:960743::\"click delete button to remove this line now\"}  Are you in the first 12 months of your Medicare Part B coverage?  { :616406::\"No\"}    Physical Health:    In general, how would you rate your overall physical health? { :334789}    Outside of work, how many days during the week do you exercise? { :605680}    Outside of work, approximately how many minutes a day do you exercise?{ :133103}    If you drink alcohol do you typically have >3 drinks per day or >7 drinks per week? { :199296}    Do you usually eat at least 4 servings of fruit and vegetables a day, include whole grains & fiber and avoid regularly eating high fat or \"junk\" foods? { :306621::\"Yes\"}    Do you have any problems taking medications regularly?  { :345126::\"No\"}    Do you have any side effects from medications? { :763917}    Needs assistance for the following daily activities: { :148749}    Which of the following safety concerns are present in your home?  { :501672::\"none identified\"}     Hearing impairment: { :626415}    In the past 6 months, have you been bothered by leaking of urine? { :927038}    Mental Health:    In general, how would you rate your overall mental or emotional health? { :462453}  PHQ-2 Score:      Do you feel safe in your environment? { :154719}    Do you have a Health Care Directive? { :307320}    Additional concerns to address?  {If YES, notify patient they may be responsible for a copay, coinsurance or deductible if the visit today includes services such as checking on a sore throat, having an x-ray or lab test, or treating and evaluating a new or existing condition " ":891641::\"No\"}    Fall risk:  { :021290}  {If any of the above assessments are answered yes, consider ordering appropriate referrals (Optional):827317::\"click delete button to remove this line now\"}  Cognitive Screening: { :137769}    {Do you have sleep apnea, excessive snoring or daytime drowsiness? (Optional):269525}    {Outside tests to abstract? :040717}    {additional problems to add (Optional):784845}    Reviewed and updated as needed this visit by clinical staff         Reviewed and updated as needed this visit by Provider        Social History     Tobacco Use     Smoking status: Never Smoker     Smokeless tobacco: Never Used   Substance Use Topics     Alcohol use: Yes     Comment: Minimal use                           Current providers sharing in care for this patient include:   Patient Care Team:  Nita Fulton as PCP - General (Nurse Practitioner)  Ines Peoples APRN CNP as Nurse Practitioner (Nurse Practitioner)  Akua Turner RN as Nurse Coordinator (Neurology)    The following health maintenance items are reviewed in Epic and correct as of today:  Health Maintenance   Topic Date Due     DEPRESSION ACTION PLAN  06/16/1965     PHQ-9 Q6 MONTHS  06/16/1965     HEPATITIS C SCREENING  06/16/1965     DTAP/TDAP/TD IMMUNIZATION (1 - Tdap) 06/16/1972     MAMMO SCREEN Q2 YR (SYSTEM ASSIGNED)  06/16/1987     LIPID SCREEN Q5 YR FEMALE (SYSTEM ASSIGNED)  06/16/1992     COLON CANCER SCREEN (SYSTEM ASSIGNED)  06/16/1997     DEXA SCAN SCREENING (SYSTEM ASSIGNED)  06/16/2012     ZOSTER IMMUNIZATION (2 of 3) 09/24/2012     ADVANCE DIRECTIVE PLANNING Q5 YRS  09/11/2017     FALL RISK ASSESSMENT  05/30/2019     INFLUENZA VACCINE  Completed     IPV IMMUNIZATION  Aged Out     MENINGITIS IMMUNIZATION  Aged Out     {Chronicprobdata (Optional):583308}  {Decision Support (Optional):611613}    ROS:  {ROS COMP:713383}    OBJECTIVE:   There were no vitals taken for this visit. There is no height or weight on " "file to calculate BMI.  EXAM:   {Exam :859053}    {Diagnostic Test Results (Optional):500840::\"Diagnostic Test Results:\",\"none \"}    ASSESSMENT / PLAN:   {Diag Picklist:676441}    End of Life Planning:  Patient currently has an advanced directive: { :397034}    COUNSELING:  {Medicare Counselin}    BP Readings from Last 1 Encounters:   18 132/88     There is no height or weight on file to calculate BMI.    {BP Counseling- Complete if BP >= 120/80  (Optional):428016}  {Weight Management Plan (ACO) Complete if BMI is abnormal-  Ages 18-64  BMI >24.9.  Age 65+ with BMI <23 or >30 (Optional):685865}     reports that  has never smoked. she has never used smokeless tobacco.  {Tobacco Cessation -- Complete if patient is a smoker (Optional):977336}    Appropriate preventive services were discussed with this patient, including applicable screening as appropriate for cardiovascular disease, diabetes, osteopenia/osteoporosis, and glaucoma.  As appropriate for age/gender, discussed screening for colorectal cancer, prostate cancer, breast cancer, and cervical cancer. Checklist reviewing preventive services available has been given to the patient.    Reviewed patients plan of care and provided an AVS. The {CarePlan:344117} for Jessica meets the Care Plan requirement. This Care Plan has been established and reviewed with the {PATIENT, FAMILY MEMBER, CAREGIVER:097478}.    Counseling Resources:  ATP IV Guidelines  Pooled Cohorts Equation Calculator  Breast Cancer Risk Calculator  FRAX Risk Assessment  ICSI Preventive Guidelines  Dietary Guidelines for Americans,   USDA's MyPlate  ASA Prophylaxis  Lung CA Screening    Savita Kiran MD  [facility] [place]  "

## 2019-01-09 NOTE — LETTER
My Depression Action Plan  Name: Jessica Leo   Date of Birth 1945  Date: 1/9/2019    My doctor: Nita Fulton   My clinic: [address]  [place] [id number] [phone]          GREEN    ZONE   Good Control    What it looks like:     Things are going generally well. You have normal up s and down s. You may even feel depressed from time to time, but bad moods usually last less than a day.   What you need to do:  1. Continue to care for yourself (see self care plan)  2. Check your depression survival kit and update it as needed  3. Follow your physician s recommendations including any medication.  4. Do not stop taking medication unless you consult with your physician first.           YELLOW         ZONE Getting Worse    What it looks like:     Depression is starting to interfere with your life.     It may be hard to get out of bed; you may be starting to isolate yourself from others.    Symptoms of depression are starting to last most all day and this has happened for several days.     You may have suicidal thoughts but they are not constant.   What you need to do:     1. Call your care team, your response to treatment will improve if you keep your care team informed of your progress. Yellow periods are signs an adjustment may need to be made.     2. Continue your self-care, even if you have to fake it!    3. Talk to someone in your support network    4. Open up your depression survival kit           RED    ZONE Medical Alert - Get Help    What it looks like:     Depression is seriously interfering with your life.     You may experience these or other symptoms: You can t get out of bed most days, can t work or engage in other necessary activities, you have trouble taking care of basic hygiene, or basic responsibilities, thoughts of suicide or death that will not go away, self-injurious behavior.     What you need to do:  1. Call your care team and request  a same-day appointment. If they are not available (weekends or after hours) call your local crisis line, emergency room or 911.            Depression Self Care Plan / Survival Kit    Self-Care for Depression  Here s the deal. Your body and mind are really not as separate as most people think.  What you do and think affects how you feel and how you feel influences what you do and think. This means if you do things that people who feel good do, it will help you feel better.  Sometimes this is all it takes.  There is also a place for medication and therapy depending on how severe your depression is, so be sure to consult with your medical provider and/ or Behavioral Health Consultant if your symptoms are worsening or not improving.     In order to better manage my stress, I will:    Exercise  Get some form of exercise, every day. This will help reduce pain and release endorphins, the  feel good  chemicals in your brain. This is almost as good as taking antidepressants!  This is not the same as joining a gym and then never going! (they count on that by the way ) It can be as simple as just going for a walk or doing some gardening, anything that will get you moving.      Hygiene   Maintain good hygiene (Get out of bed in the morning, Make your bed, Brush your teeth, Take a shower, and Get dressed like you were going to work, even if you are unemployed).  If your clothes don't fit try to get ones that do.    Diet  I will strive to eat foods that are good for me, drink plenty of water, and avoid excessive sugar, caffeine, alcohol, and other mood-altering substances.  Some foods that are helpful in depression are: complex carbohydrates, B vitamins, flaxseed, fish or fish oil, fresh fruits and vegetables.    Psychotherapy  I agree to participate in Individual Therapy (if recommended).    Medication  If prescribed medications, I agree to take them.  Missing doses can result in serious side effects.  I understand that drinking  alcohol, or other illicit drug use, may cause potential side effects.  I will not stop my medication abruptly without first discussing it with my provider.    Staying Connected With Others  I will stay in touch with my friends, family members, and my primary care provider/team.    Use your imagination  Be creative.  We all have a creative side; it doesn t matter if it s oil painting, sand castles, or mud pies! This will also kick up the endorphins.    Witness Beauty  (AKA stop and smell the roses) Take a look outside, even in mid-winter. Notice colors, textures. Watch the squirrels and birds.     Service to others  Be of service to others.  There is always someone else in need.  By helping others we can  get out of ourselves  and remember the really important things.  This also provides opportunities for practicing all the other parts of the program.    Humor  Laugh and be silly!  Adjust your TV habits for less news and crime-drama and more comedy.    Control your stress  Try breathing deep, massage therapy, biofeedback, and meditation. Find time to relax each day.     My support system    Clinic Contact:  Phone number:    Contact 1:  Phone number:    Contact 2:  Phone number:    Voodoo/:  Phone number:    Therapist:  Phone number:    Local crisis center:    Phone number:    Other community support:  Phone number:

## 2019-01-10 ASSESSMENT — ANXIETY QUESTIONNAIRES: GAD7 TOTAL SCORE: 7

## 2019-03-05 ENCOUNTER — OFFICE VISIT (OUTPATIENT)
Dept: PODIATRY | Facility: CLINIC | Age: 74
End: 2019-03-05
Payer: MEDICARE

## 2019-03-05 ENCOUNTER — ANCILLARY PROCEDURE (OUTPATIENT)
Dept: GENERAL RADIOLOGY | Facility: CLINIC | Age: 74
End: 2019-03-05
Attending: PODIATRIST
Payer: MEDICARE

## 2019-03-05 VITALS
WEIGHT: 185 LBS | DIASTOLIC BLOOD PRESSURE: 82 MMHG | SYSTOLIC BLOOD PRESSURE: 118 MMHG | HEART RATE: 102 BPM | BODY MASS INDEX: 35.54 KG/M2

## 2019-03-05 DIAGNOSIS — M21.611 BUNION OF RIGHT FOOT: ICD-10-CM

## 2019-03-05 DIAGNOSIS — M21.611 BUNION OF RIGHT FOOT: Primary | ICD-10-CM

## 2019-03-05 DIAGNOSIS — M19.071 ARTHRITIS OF FOOT, RIGHT: ICD-10-CM

## 2019-03-05 PROCEDURE — 73630 X-RAY EXAM OF FOOT: CPT | Mod: RT

## 2019-03-05 PROCEDURE — 99203 OFFICE O/P NEW LOW 30 MIN: CPT | Performed by: PODIATRIST

## 2019-03-05 NOTE — PATIENT INSTRUCTIONS
Weight management plan: Patient was referred to their PCP to discuss a diet and exercise plan.   We wish you continued good healing. If you have any questions or concerns, please do not hesitate to contact us at [phone]    Please remember to call and schedule a follow up appointment if one was recommended at your earliest convenience.   PODIATRY CLINIC HOURS  TELEPHONE NUMBER    Dr. Eulalio Macedo D.P.M [facility]    Clinics:  [facility]    [facility]   Tuesday 1PM-6[place]  Wednesday 7AM-2PM  [facility]  Thursday 10AM-6PM  [place]  Friday 7AM-3PM  [place]  Specialty schedulers:   [phone] to make an appointment with any Specialty Provider.        Urgent Care locations:    [facility] Monday-Friday 5 pm - 9 pm. Saturday-Sunday 9 am -5pm    Monday-Friday 11 am - 9 pm Saturday 9 am - 5 pm     Monday-Sunday 12 noon-8PM [phone]     If you need a medication refill, please contact us you may need lab work and/or a follow up visit prior to your refill (i.e. Antifungal medications).    [employer] (secure e-mail communication and access to your chart) to send a message or to make an appointment.    If MRI needed please call [place] [facility] at [phone]        Jessica to follow up with Primary Care provider regarding elevated blood pressure.

## 2019-03-05 NOTE — LETTER
3/5/2019         RE: Jessica Leo  [address] [facility]  [place] [address]        Dear Colleague,    Thank you for referring your patient, Jessica Leo, to the [facility] [place]. Please see a copy of my visit note below.    Subjective:    Pt is seen today as a new pt referral with the c/c of painful rightfoot.  This has been symptomatic for the past several years.  What bothers patient most is her right third MTPJ.  She has pain with walking.  It also hurts her in bed at night.  She also has some discomfort laterally.  Patient points to later fifth metatarsal head.    Describes this as a burning pain.  Aggravated by activity and releaved by rest.  Has a history of bunion surgery on this foot.  Does not really have any pain in this area at this time.  She is retired.  She wears socks or malleable slippers around the house.    ROS:  A 10-point review of systems was performed and is positive for that noted in the HPI and as seen above.  All other areas are negative.          Allergies   Allergen Reactions     Hydrocodone-Acetaminophen      Other reaction(s): Vomiting     Tetracyclines Hives     Sertraline      Other reaction(s): Other - Describe In Comment Field  Blurred vision, unsteady gait, word finding problem, libido     Trazodone      Other reaction(s): Sleep Disturbances  Patient could not sleep when taking this medication     Erythromycin Rash     Latex Rash     Tetracycline Rash       Current Outpatient Medications   Medication Sig Dispense Refill     amitriptyline (ELAVIL) 10 MG tablet Take 20 mg by mouth       cetirizine (ZYRTEC) 10 MG tablet Take 10 mg by mouth daily       Cranberry 400 MG CAPS Take 1 capsule by mouth daily       diclofenac (VOLTAREN) 75 MG EC tablet Take 1 tablet (75 mg) by mouth 2 times daily 90 tablet 3     fluticasone (FLONASE) 50 MCG/ACT spray Spray 1 spray in nostril daily       hydrochlorothiazide (HYDRODIURIL) 25 MG tablet Take 25 mg by mouth daily        hydrOXYzine (ATARAX) 25 MG tablet Take 1-2 mg by mouth daily as needed       lisinopril (PRINIVIL/ZESTRIL) 10 MG tablet Take 10 mg by mouth daily       Multiple Vitamin (MULTI-VITAMINS) TABS Take 1 tablet by mouth daily       naproxen sodium (ANAPROX) 220 MG tablet Take 220 mg by mouth 2 times daily as needed       omega 3 1000 MG CAPS Take 2 g by mouth 2 times daily       omeprazole (PRILOSEC) 20 MG CR capsule Take 20 mg by mouth twice a week       simvastatin (ZOCOR) 10 MG tablet Take 10 mg by mouth daily       valACYclovir (VALTREX) 500 MG tablet Take 500 mg by mouth daily         Patient Active Problem List   Diagnosis     ACP (advance care planning)     Allergic rhinitis     Anxiety     Chronic sinusitis     Conductive hearing loss, unilateral     Constipation     Controlled substance agreement signed     Depression, recurrent (H)     Esophageal reflux     Health maintenance examination     Hyperlipidemia with target low density lipoprotein (LDL) cholesterol less than 130 mg/dL     LVH (left ventricular hypertrophy) due to hypertensive disease     Narcotic withdrawal (H)     Obese     Osteoarthrosis     Pain from implanted hardware     Pain in joint, shoulder region     S/P cervical spinal fusion     Sensorineural hearing loss, unilateral     Skin tag     Tailor's bunion     Tinnitus     Essential hypertension     Gastritis and gastroduodenitis with hemorrhage       Past Medical History:   Diagnosis Date     Depressive disorder SAD    Go on medication during winter months     History of herpes genitalis     on Valtrex     Hyperlipidemia LDL goal <130      Hypertension goal BP (blood pressure) < 140/90      Insomnia      Other nervous system complications Chronic    Anxiety     Seasonal affective disorder (H)      Vestibular migraine        Past Surgical History:   Procedure Laterality Date     BUNIONECTOMY Bilateral      CHOLECYSTECTOMY, OPEN       FUSION CERVICAL POSTERIOR TWO LEVELS       hammer toe Left      repair     LUMPECTOMY BREAST Left        Family History   Problem Relation Age of Onset     Cancer Paternal Grandfather      Diabetes Mother      Heart Disease Mother      Diabetes Maternal Grandmother      Heart Disease Maternal Grandmother      Diabetes Paternal Grandmother      Heart Disease Paternal Grandmother      Heart Disease Father      LUNG DISEASE Father          of Wegener s Granulamatosis     Heart Disease Brother      Heart Disease Maternal Grandfather        Social History     Tobacco Use     Smoking status: Never Smoker     Smokeless tobacco: Never Used   Substance Use Topics     Alcohol use: Yes     Comment: Minimal use         Exam:    Vitals: /82 (BP Location: Right arm, Patient Position: Sitting)   Pulse 102   Wt 83.9 kg (185 lb)   BMI 35.54 kg/m     BMI: Body mass index is 35.54 kg/m .  Height: Data Unavailable    Constitutional/ general:  Pt is in no apparent distress, appears well-nourished.  Cooperative with history and physical exam.     Psych:  The patient answered questions appropriately.  Normal affect.  Seems to have reasonable expectations, in terms of treatment.     Eyes:  Visual scanning/ tracking without deficit.     Ears:  Response to auditory stimuli is normal.  negative hearing aid devices.  Auricles in proper alignment.     Lymphatic:  Popliteal lymph nodes not enlarged.     Lungs:  Non labored breathing, non labored speech. No cough.  No audible wheezing. Even, quiet breathing.       Vascular:  positive pedal pulses bilaterally for both the DP and PT arteries.  CFT < 3 sec.  positive ankle edema.  positive pedal hair growth.    Neuro:  Alert and oriented x 3. Coordinated gait.  Light touch sensation is intact to the L4, L5, S1 distributions. No obvious deficits.  No evidence of neurological-based weakness, spasticity, or contracture in the lower extremities.      Derm: Normal texture and turgor.  No erythema, ecchymosis, or cyanosis.      Musculoskeletal:      Patient is ambulatory without an assistive device or brace.  Pronated arch with weightbearing.   MS 5/5 all compartments.  Normal ROM all  rearfoot joints.  No equinus.   With weightbearing right foot hallux is rectus.  About 30% normal range of motion here with dorsal bossing noted.  All her lesser toes are medially deviated.  Patient has most pain with palpation or range of motion of the right third MTPJ.  There is minimal on the second or fourth MTPJ.  Patient has a right side Tailor's bunion with weightbearing.  Negative for lateral bursa formation.  No pain plantar or dorsal.  No callus formation minimal range of motion of the right fifth toe and this is somewhat painful.    Radiographic Exam:    FOOT RIGHT THREE OR MORE VIEWS  3/5/2019 3:19 PM      HISTORY: Bunion of right foot                                                                      IMPRESSION: Postsurgical changes of bunionectomy and first metatarsal  osteotomy with a single screw in place. There is chronic deformity of  the fifth metatarsal head with chronic-appearing subluxation at the  fifth metatarsophalangeal joint. First through third  metatarsophalangeal joint space narrowing is noted, likely  degenerative.    A/P  Right third mtpj arthritis, deformity  Right bunionette, arthritis  Right first mtpj arthritis       X-rays taken today.  Explained to the patient that she has arthritis in numerous MTPJ's with the third being the most painful.  Discussed cause of this with patient.  Will wear wide shoes that are stiff and I made suggestions.  Good stiff shoes at all times and I made suggestions.  Avoid activities that bother this and discussed what will bother this.  Return to the clinic in 1 month for reevaluation.         Eulalio Macedo DPM, FACFAS      Again, thank you for allowing me to participate in the care of your patient.        Sincerely,        Eulalio Macedo DPM

## 2019-03-06 NOTE — PROGRESS NOTES
Subjective:    Pt is seen today as a new pt referral with the c/c of painful rightfoot.  This has been symptomatic for the past several years.  What bothers patient most is her right third MTPJ.  She has pain with walking.  It also hurts her in bed at night.  She also has some discomfort laterally.  Patient points to later fifth metatarsal head.    Describes this as a burning pain.  Aggravated by activity and releaved by rest.  Has a history of bunion surgery on this foot.  Does not really have any pain in this area at this time.  She is retired.  She wears socks or malleable slippers around the house.    ROS:  A 10-point review of systems was performed and is positive for that noted in the HPI and as seen above.  All other areas are negative.          Allergies   Allergen Reactions     Hydrocodone-Acetaminophen      Other reaction(s): Vomiting     Tetracyclines Hives     Sertraline      Other reaction(s): Other - Describe In Comment Field  Blurred vision, unsteady gait, word finding problem, libido     Trazodone      Other reaction(s): Sleep Disturbances  Patient could not sleep when taking this medication     Erythromycin Rash     Latex Rash     Tetracycline Rash       Current Outpatient Medications   Medication Sig Dispense Refill     amitriptyline (ELAVIL) 10 MG tablet Take 20 mg by mouth       cetirizine (ZYRTEC) 10 MG tablet Take 10 mg by mouth daily       Cranberry 400 MG CAPS Take 1 capsule by mouth daily       diclofenac (VOLTAREN) 75 MG EC tablet Take 1 tablet (75 mg) by mouth 2 times daily 90 tablet 3     fluticasone (FLONASE) 50 MCG/ACT spray Spray 1 spray in nostril daily       hydrochlorothiazide (HYDRODIURIL) 25 MG tablet Take 25 mg by mouth daily       hydrOXYzine (ATARAX) 25 MG tablet Take 1-2 mg by mouth daily as needed       lisinopril (PRINIVIL/ZESTRIL) 10 MG tablet Take 10 mg by mouth daily       Multiple Vitamin (MULTI-VITAMINS) TABS Take 1 tablet by mouth daily       naproxen sodium (ANAPROX)  220 MG tablet Take 220 mg by mouth 2 times daily as needed       omega 3 1000 MG CAPS Take 2 g by mouth 2 times daily       omeprazole (PRILOSEC) 20 MG CR capsule Take 20 mg by mouth twice a week       simvastatin (ZOCOR) 10 MG tablet Take 10 mg by mouth daily       valACYclovir (VALTREX) 500 MG tablet Take 500 mg by mouth daily         Patient Active Problem List   Diagnosis     ACP (advance care planning)     Allergic rhinitis     Anxiety     Chronic sinusitis     Conductive hearing loss, unilateral     Constipation     Controlled substance agreement signed     Depression, recurrent (H)     Esophageal reflux     Health maintenance examination     Hyperlipidemia with target low density lipoprotein (LDL) cholesterol less than 130 mg/dL     LVH (left ventricular hypertrophy) due to hypertensive disease     Narcotic withdrawal (H)     Obese     Osteoarthrosis     Pain from implanted hardware     Pain in joint, shoulder region     S/P cervical spinal fusion     Sensorineural hearing loss, unilateral     Skin tag     Tailor's bunion     Tinnitus     Essential hypertension     Gastritis and gastroduodenitis with hemorrhage       Past Medical History:   Diagnosis Date     Depressive disorder SAD    Go on medication during winter months     History of herpes genitalis     on Valtrex     Hyperlipidemia LDL goal <130      Hypertension goal BP (blood pressure) < 140/90      Insomnia      Other nervous system complications Chronic    Anxiety     Seasonal affective disorder (H)      Vestibular migraine        Past Surgical History:   Procedure Laterality Date     BUNIONECTOMY Bilateral      CHOLECYSTECTOMY, OPEN       FUSION CERVICAL POSTERIOR TWO LEVELS       hammer toe Left     repair     LUMPECTOMY BREAST Left        Family History   Problem Relation Age of Onset     Cancer Paternal Grandfather      Diabetes Mother      Heart Disease Mother      Diabetes Maternal Grandmother      Heart Disease Maternal Grandmother       Diabetes Paternal Grandmother      Heart Disease Paternal Grandmother      Heart Disease Father      LUNG DISEASE Father          of Wegener s Granulamatosis     Heart Disease Brother      Heart Disease Maternal Grandfather        Social History     Tobacco Use     Smoking status: Never Smoker     Smokeless tobacco: Never Used   Substance Use Topics     Alcohol use: Yes     Comment: Minimal use         Exam:    Vitals: /82 (BP Location: Right arm, Patient Position: Sitting)   Pulse 102   Wt 83.9 kg (185 lb)   BMI 35.54 kg/m    BMI: Body mass index is 35.54 kg/m .  Height: Data Unavailable    Constitutional/ general:  Pt is in no apparent distress, appears well-nourished.  Cooperative with history and physical exam.     Psych:  The patient answered questions appropriately.  Normal affect.  Seems to have reasonable expectations, in terms of treatment.     Eyes:  Visual scanning/ tracking without deficit.     Ears:  Response to auditory stimuli is normal.  negative hearing aid devices.  Auricles in proper alignment.     Lymphatic:  Popliteal lymph nodes not enlarged.     Lungs:  Non labored breathing, non labored speech. No cough.  No audible wheezing. Even, quiet breathing.       Vascular:  positive pedal pulses bilaterally for both the DP and PT arteries.  CFT < 3 sec.  positive ankle edema.  positive pedal hair growth.    Neuro:  Alert and oriented x 3. Coordinated gait.  Light touch sensation is intact to the L4, L5, S1 distributions. No obvious deficits.  No evidence of neurological-based weakness, spasticity, or contracture in the lower extremities.      Derm: Normal texture and turgor.  No erythema, ecchymosis, or cyanosis.      Musculoskeletal:     Patient is ambulatory without an assistive device or brace.  Pronated arch with weightbearing.   MS 5/5 all compartments.  Normal ROM all  rearfoot joints.  No equinus.   With weightbearing right foot hallux is rectus.  About 30% normal range of motion  here with dorsal bossing noted.  All her lesser toes are medially deviated.  Patient has most pain with palpation or range of motion of the right third MTPJ.  There is minimal on the second or fourth MTPJ.  Patient has a right side Tailor's bunion with weightbearing.  Negative for lateral bursa formation.  No pain plantar or dorsal.  No callus formation minimal range of motion of the right fifth toe and this is somewhat painful.    Radiographic Exam:    FOOT RIGHT THREE OR MORE VIEWS  3/5/2019 3:19 PM      HISTORY: Bunion of right foot                                                                      IMPRESSION: Postsurgical changes of bunionectomy and first metatarsal  osteotomy with a single screw in place. There is chronic deformity of  the fifth metatarsal head with chronic-appearing subluxation at the  fifth metatarsophalangeal joint. First through third  metatarsophalangeal joint space narrowing is noted, likely  degenerative.    A/P  Right third mtpj arthritis, deformity  Right bunionette, arthritis  Right first mtpj arthritis       X-rays taken today.  Explained to the patient that she has arthritis in numerous MTPJ's with the third being the most painful.  Discussed cause of this with patient.  Will wear wide shoes that are stiff and I made suggestions.  Good stiff shoes at all times and I made suggestions.  Avoid activities that bother this and discussed what will bother this.  Return to the clinic in 1 month for reevaluation.         Eulalio Macedo DPM, FACFAS

## 2019-06-03 ENCOUNTER — TRANSFERRED RECORDS (OUTPATIENT)
Dept: HEALTH INFORMATION MANAGEMENT | Facility: CLINIC | Age: 74
End: 2019-06-03

## 2019-07-15 ENCOUNTER — TRANSFERRED RECORDS (OUTPATIENT)
Dept: HEALTH INFORMATION MANAGEMENT | Facility: CLINIC | Age: 74
End: 2019-07-15

## 2019-08-12 ENCOUNTER — TRANSFERRED RECORDS (OUTPATIENT)
Dept: HEALTH INFORMATION MANAGEMENT | Facility: CLINIC | Age: 74
End: 2019-08-12

## 2019-08-12 LAB — PHQ9 SCORE: 2

## 2019-08-23 ENCOUNTER — TRANSFERRED RECORDS (OUTPATIENT)
Dept: HEALTH INFORMATION MANAGEMENT | Facility: CLINIC | Age: 74
End: 2019-08-23

## 2020-03-09 ENCOUNTER — TRANSFERRED RECORDS (OUTPATIENT)
Dept: HEALTH INFORMATION MANAGEMENT | Facility: CLINIC | Age: 75
End: 2020-03-09

## 2020-03-11 ENCOUNTER — HEALTH MAINTENANCE LETTER (OUTPATIENT)
Age: 75
End: 2020-03-11

## 2020-06-19 ENCOUNTER — TRANSFERRED RECORDS (OUTPATIENT)
Dept: HEALTH INFORMATION MANAGEMENT | Facility: CLINIC | Age: 75
End: 2020-06-19

## 2020-06-26 ENCOUNTER — TRANSFERRED RECORDS (OUTPATIENT)
Dept: HEALTH INFORMATION MANAGEMENT | Facility: CLINIC | Age: 75
End: 2020-06-26

## 2020-07-27 ENCOUNTER — TRANSFERRED RECORDS (OUTPATIENT)
Dept: HEALTH INFORMATION MANAGEMENT | Facility: CLINIC | Age: 75
End: 2020-07-27

## 2020-08-21 ENCOUNTER — TRANSFERRED RECORDS (OUTPATIENT)
Dept: HEALTH INFORMATION MANAGEMENT | Facility: CLINIC | Age: 75
End: 2020-08-21

## 2021-01-03 ENCOUNTER — HEALTH MAINTENANCE LETTER (OUTPATIENT)
Age: 76
End: 2021-01-03

## 2021-04-25 ENCOUNTER — HEALTH MAINTENANCE LETTER (OUTPATIENT)
Age: 76
End: 2021-04-25

## 2021-10-10 ENCOUNTER — HEALTH MAINTENANCE LETTER (OUTPATIENT)
Age: 76
End: 2021-10-10

## 2022-05-01 NOTE — PATIENT INSTRUCTIONS
"Patient had evaluation thru her PCP for any other medical reasons, but no CBC, TSH, Vitamin B12 and MMA, A1C (frequent urination).  Vestibular therapy   Hydration and stop caffeine, good sleep routine  and food triggers-no chocolate, alcohol.   Recheck your blood pressure at home and see your PCP for blood pressure variation. Your blood pressure today dropped with position changed from sitting to standing  Talk to your PCP about insomnia which may be contributing to your symptoms  If persists, would recommend further imaging with brain and neck MR angiogram for posterior circulation evaluation  Return to our Clinic as needed if not improving or any other concerns      Right eye floaters worsening. Recommended to see ophthalmologist.       Per outside brain MRI report \"Nonspecific punctate focus of subcortical enhancement in the right frontal  lobe. This may be vascular, but is not well evaluated. Clinical correlation  recommended. Consider 4-6 month follow-up, as clinically appropriate\".      " Due to opioids  On bowel regimen

## 2022-05-21 ENCOUNTER — HEALTH MAINTENANCE LETTER (OUTPATIENT)
Age: 77
End: 2022-05-21

## 2022-09-18 ENCOUNTER — HEALTH MAINTENANCE LETTER (OUTPATIENT)
Age: 77
End: 2022-09-18

## 2023-01-28 ENCOUNTER — HEALTH MAINTENANCE LETTER (OUTPATIENT)
Age: 78
End: 2023-01-28